# Patient Record
Sex: MALE | Race: WHITE | NOT HISPANIC OR LATINO | URBAN - METROPOLITAN AREA
[De-identification: names, ages, dates, MRNs, and addresses within clinical notes are randomized per-mention and may not be internally consistent; named-entity substitution may affect disease eponyms.]

---

## 2019-07-11 ENCOUNTER — OUTPATIENT (OUTPATIENT)
Dept: OUTPATIENT SERVICES | Facility: HOSPITAL | Age: 49
LOS: 1 days | Discharge: HOME | End: 2019-07-11

## 2019-07-11 DIAGNOSIS — I10 ESSENTIAL (PRIMARY) HYPERTENSION: ICD-10-CM

## 2019-07-11 DIAGNOSIS — R80.9 PROTEINURIA, UNSPECIFIED: ICD-10-CM

## 2019-10-17 ENCOUNTER — EMERGENCY (EMERGENCY)
Facility: HOSPITAL | Age: 49
LOS: 0 days | Discharge: HOME | End: 2019-10-17
Attending: EMERGENCY MEDICINE | Admitting: EMERGENCY MEDICINE
Payer: COMMERCIAL

## 2019-10-17 VITALS
RESPIRATION RATE: 18 BRPM | DIASTOLIC BLOOD PRESSURE: 86 MMHG | TEMPERATURE: 96 F | SYSTOLIC BLOOD PRESSURE: 126 MMHG | HEART RATE: 80 BPM | OXYGEN SATURATION: 99 %

## 2019-10-17 VITALS
TEMPERATURE: 96 F | SYSTOLIC BLOOD PRESSURE: 168 MMHG | HEART RATE: 84 BPM | DIASTOLIC BLOOD PRESSURE: 105 MMHG | WEIGHT: 199.96 LBS | RESPIRATION RATE: 18 BRPM | OXYGEN SATURATION: 100 %

## 2019-10-17 DIAGNOSIS — R04.0 EPISTAXIS: ICD-10-CM

## 2019-10-17 DIAGNOSIS — I10 ESSENTIAL (PRIMARY) HYPERTENSION: ICD-10-CM

## 2019-10-17 PROCEDURE — 99283 EMERGENCY DEPT VISIT LOW MDM: CPT | Mod: 25

## 2019-10-17 PROCEDURE — 30903 CONTROL OF NOSEBLEED: CPT

## 2019-10-17 RX ORDER — CEPHALEXIN 500 MG
1 CAPSULE ORAL
Qty: 30 | Refills: 0
Start: 2019-10-17 | End: 2019-10-26

## 2019-10-17 NOTE — ED PROVIDER NOTE - CARE PROVIDER_API CALL
Donnell Woodruff)  Otolaryngology  [address] [place] [address], [address] [place] [address]  Phone: [phone]  Fax: [phone]  Follow Up Time: 1-3 Days

## 2019-10-17 NOTE — ED ADULT NURSE NOTE - CHPI ED NUR SYMPTOMS NEG
no chills/no decreased eating/drinking/no tingling/no weakness/no nausea/no pain/no vomiting/no dizziness/no fever

## 2019-10-17 NOTE — ED ADULT NURSE NOTE - OBJECTIVE STATEMENT
pt states that he was sleeping and suddenly began to experience a nose bleed x2 hours, denies injury or trauma, not on blood thinners

## 2019-10-17 NOTE — ED PROVIDER NOTE - CLINICAL SUMMARY MEDICAL DECISION MAKING FREE TEXT BOX
49m w L nares epistaxis. nontoxic appearing, n/v intact, no symptoms of anemia. No use of blood thinners. Direct pressure applied w initial resolution of bleeding but then recurrence. L nares nasal packing performed w resolution of bleeding. Abx prescribed for ppx. Pt advised regarding symptomatic/supportive care, importance of PMD/ENT f/u, and symptoms to prompt ED return.

## 2019-10-17 NOTE — ED ADULT NURSE NOTE - CAS ELECT INFOMATION PROVIDED
DC instructions/pt d.nikita with rhino rocket in left nare, instructed to follow up with ent or return to ed for new or worsening symptoms

## 2019-10-17 NOTE — ED ADULT NURSE NOTE - NSIMPLEMENTINTERV_GEN_ALL_ED
Implemented All Universal Safety Interventions:  [place] to call system. Call bell, personal items and telephone within reach. Instruct patient to call for assistance. Room bathroom lighting operational. Non-slip footwear when patient is off stretcher. Physically safe environment: no spills, clutter or unnecessary equipment. Stretcher in lowest position, wheels locked, appropriate side rails in place.
normal...

## 2019-10-17 NOTE — ED PROVIDER NOTE - OBJECTIVE STATEMENT
48 y/o M with PMH HTN, not on blood thinners/asa presents with L nare epistaxis which began suddenly and painlessly while seated. +mild/constant. no palliating/provoking factors. denies trauma/injury.

## 2019-10-17 NOTE — ED PROVIDER NOTE - ADDITIONAL RISK FACTOR FREE TEXT BOX
49m w L nares epistaxis. nontoxic appearing, n/v intact, no symptoms of anemia. No use of blood thinners.

## 2019-10-17 NOTE — ED PROVIDER NOTE - ATTENDING CONTRIBUTION TO CARE
49m w HTN presents w epistaxis from L nares tonight. Symptoms are mild, constant, no exacerbating/alleviating. No trauma/injury. No use of blood thinners. Patient in usual state of health prior. No other injury, no other complaints.     Review of Systems  Constitutional:  No fever or chills.   Eyes:  Negative.   ENMT:  No nasal congestion, discharge, or throat pain. +Epistaxis  Cardiac:  No chest pain, syncope, or edema.  Respiratory:  No dyspnea, wheezing, or cough. No hemoptysis.  GI:  No vomiting, diarrhea, or abdominal pain. No melena or hematochezia.  :  No dysuria or hematuria.   Musculoskeletal:  No joint swelling, joint pain, or back pain.  Skin:  No skin rash, jaundice, or lesions.  Neuro:  No headache, loss of sensation, or focal weakness.  No change in mental status.     Physical Exam  General: Awake, alert, NAD, WDWN, non-toxic appearing, NCAT  Eyes: PERRL, EOMI, no icterus, lids and conjunctivae are normal  ENT: External inspection normal, pink/moist membranes, pharynx normal, no blood in posterior pharynx. Mild oozing blood from L anterior plexus.  CV: S1S2, regular rate and rhythm, no murmur/gallops/rubs, no JVD, 2+ pulses b/l, no edema/cords/homans, warm/well-perfused  Respiratory: Normal respiratory rate/effort, no respiratory distress, normal voice, speaking full sentences, lungs clear to auscultation b/l, no wheezing/rales/rhonchi, no retractions, no stridor  Abdomen: Soft abdomen, no tender/distended/guarding/rebound, no CVA tender  Musculoskeletal: FROM all 4 extremities, N/V intact  Neck: FROM neck, supple, no meningismus, trachea midline, no JVD  Integumentary: Color normal for race, warm and dry, no rash  Neuro: Oriented x3, CN 2-12 grossly intact, normal motor, normal sensory  Psych: Oriented x3, mood normal, affect normal     49m w L nares epistaxis. nontoxic appearing, n/v intact, no symptoms of anemia. No use of blood thinners. --Will obtain hemostasis, observe/re-assess.

## 2019-10-17 NOTE — ED PROCEDURE NOTE - ATTENDING CONTRIBUTION TO CARE
I was present for and supervised the key/critical aspects of the procedures performed during the care of the patient. Nasal Packing

## 2019-10-17 NOTE — ED PROVIDER NOTE - PROGRESS NOTE DETAILS
current minimal oozing. direct pressure applied, will reassess. pt rebleed with oozing from L nare. packed. reassessed now, 1 hr later, still no bleed. pt understands needs to see ENT today and relates will comply. sent abx rx to pharmacy.

## 2019-10-17 NOTE — ED PROVIDER NOTE - PHYSICAL EXAMINATION
PHYSICAL EXAM:    GENERAL: Alert, appears stated age, well appearing, non-toxic  SKIN: Warm, pink and dry. MMM.   EYE: Normal lids/conjunctiva  ENT: Normal hearing, patent oropharynx without erythema or exudate. +L nare slight oozing blood. no open wounds visible. no blood in oropharynx.   NECK: +supple. No meningismus, or JVD  Pulm: Bilateral BS, normal resp effort, no wheezes, stridor, or retractions  CV: RRR, no M/R/G, 2+and = radial pulses  Abd: soft, non-tender, non-distended  Mskel: no erythema, cyanosis, edema. no calf tenderness  Neuro: AAOx3 5/5 strength throughout. normal gait.

## 2019-10-17 NOTE — ED PROVIDER NOTE - PATIENT PORTAL LINK FT
You can access the FollowMyHealth Patient Portal offered by [facility] by registering at the following website: [email or web address]. By joining [facility]’s FollowMyHealth portal, you will also be able to view your health information using other applications (apps) compatible with our system.

## 2019-10-17 NOTE — ED PROVIDER NOTE - NS ED ROS FT
Review of Systems    Constitutional: (-) fever   Eyes/ENT: (-) vision changes, (+) epistaxis   Cardiovascular: (-) chest pain, (-) syncope (-) palpitations  Respiratory: (-) cough, (-) shortness of breath  Gastrointestinal: (-) vomiting  Integumentary: (-) rash  Neurological: (-) headache  Hematologic: (-) easy bruising   Allergic/Immunologic: (-) pruritus

## 2019-10-19 ENCOUNTER — EMERGENCY (EMERGENCY)
Facility: HOSPITAL | Age: 49
LOS: 0 days | Discharge: HOME | End: 2019-10-19
Admitting: EMERGENCY MEDICINE

## 2019-10-19 VITALS
SYSTOLIC BLOOD PRESSURE: 134 MMHG | WEIGHT: 199.96 LBS | OXYGEN SATURATION: 97 % | TEMPERATURE: 97 F | HEART RATE: 108 BPM | DIASTOLIC BLOOD PRESSURE: 93 MMHG | RESPIRATION RATE: 18 BRPM | HEIGHT: 66 IN

## 2019-10-19 VITALS — HEART RATE: 90 BPM

## 2019-10-19 DIAGNOSIS — R04.0 EPISTAXIS: ICD-10-CM

## 2019-10-19 PROBLEM — I10 ESSENTIAL (PRIMARY) HYPERTENSION: Chronic | Status: ACTIVE | Noted: 2019-10-17

## 2019-10-19 NOTE — ED PROVIDER NOTE - PROGRESS NOTE DETAILS
Discussed risks of nasal packing removal including rebleeding/ repacking. Patient understands and wants packing removed. Patient watched for over 30 minutes no rebleeding.

## 2019-10-19 NOTE — ED PROVIDER NOTE - OBJECTIVE STATEMENT
50 y/o male with hx HTN presents to the ED c/o "I woke up on Wednesday night with left sided nose bleed. I was seen in the ED and my nose was packed. I was unable to get to see an ENT till next week. I want the packing removed." no HA/ dizziness/ fever/ chills

## 2019-10-19 NOTE — ED PROVIDER NOTE - CARE PROVIDER_API CALL
Donnell Woodruff)  Otolaryngology  [address] [place] [address], [address] [place] [address]  Phone: [phone]  Fax: [phone]  Follow Up Time:

## 2022-02-17 NOTE — ED PROVIDER NOTE - DATE/TIME 2
17-Oct-2019 06:34 Prednisone Counseling:  I discussed with the patient the risks of prolonged use of prednisone including but not limited to weight gain, insomnia, osteoporosis, mood changes, diabetes, susceptibility to infection, glaucoma and high blood pressure.  In cases where prednisone use is prolonged, patients should be monitored with blood pressure checks, serum glucose levels and an eye exam.  Additionally, the patient may need to be placed on GI prophylaxis, PCP prophylaxis, and calcium and vitamin D supplementation and/or a bisphosphonate.  The patient verbalized understanding of the proper use and the possible adverse effects of prednisone.  All of the patient's questions and concerns were addressed.

## 2022-04-05 NOTE — ED PROVIDER NOTE - NOSE, MLM
CONSULTATION NOTE - GENERAL NEUROLOGY    CHIEF COMPLAINT  Left numbness    HISTORY OF PRESENT ILLNESS  Mr. Hernandez is a  50 year old year old male with a history of hypertension and a left ulnar neuropathy, who was seen back in February for left sided sensory impairment, came back to the ER yesterday for evaluation of similar symptoms.  He wsa seen by me previously on 2/21/22 for left face/arm/leg numbness.  He had a negative brain MRI and CTA and was discharged on aspirin, told to follow up in clinic.  He took aspirin briefly and then stopped it, and he did not schedule a followup appointment.  Since that encounter he says he's had persistent numbness in the left foot, in a very isolated pattern involving the left lateral aspect of the foot into the dorsum.  No weakness, but if he tries to walk quickly he limps a bit.  He also has persisent tingling and numbness in digits 4-5 of the left hand.  No weakness.  No facial involvement.  He says these symptoms are relatively unchanged compared to February.  He came to the ER yesterday because he says that upon arriving to work yesterday he was flushed and sweating, so his boss sent him home.  He says he's taken his BP a few times since being here last and it has been elevated, sometimes as high as the 180s systolic.     HISTORIES:    Past Medical History:   Diagnosis Date   • Cubital tunnel syndrome on left     chronic   • Essential (primary) hypertension        Past Surgical History:   Procedure Laterality Date   • Appendectomy     • Lipoma resection         Social History     Tobacco Use   • Smoking status: Never Smoker   • Smokeless tobacco: Never Used   Vaping Use   • Vaping Use: never used   Substance Use Topics   • Alcohol use: Yes     Comment: ON OCCASION   • Drug use: Not Currently       INPATIENT MEDICATIONS:  • sodium chloride (PF)  2 mL Intracatheter 2 times per day       As needed meds: sodium chloride, sodium chloride     HOME MEDICATIONS:  Medications  Prior to Admission   Medication Sig Dispense Refill   • aspirin 81 MG EC tablet Take 1 tablet by mouth daily. Do not start before February 23, 2022. 30 tablet 0       ALLERGIES:  Allergies as of 04/04/2022   • (No Known Allergies)        REVIEW OF SYSTEMS :   Dizziness  Flushing sensation  L hand and foot numbness as above    PHYSICAL EXAM  Vitals:  /84 (BP Location: LUE - Left upper extremity, Patient Position: Supine)   Pulse 79   Temp 98.6 °F (37 °C) (Oral)   Resp 17   Ht 6' (1.829 m)   Wt 109.7 kg (241 lb 13.5 oz)   BMI 32.80 kg/m²   BSA 2.31 m²   General: Well-developed, well-nourished male.  In no acute distress.  Head & Neck:  Normocephalic and atraumatic.    Psychiatric:  Affect was appropriate to situation and mood was normal.  Neurologic:    Mentation: Alert and awake. Oriented to person and place. Speech is fluent without any anomia or dysarthria.  Patient is able to provide adequate history with a good fund of knowledge.  Short and long term memory was within normal range for age.  No evidence of obvious cognitive impairment.   Cranial Nerves:    CN I: Not tested.  CN II: Visual acuity grossly intact. Visual fields full to confrontational testing. Pupils equal and reactive (CN II/III)  CN III, IV, VI: Extraocular muscles are intact without nystagmus or gaze paresis. There is no ptosis.  CN V: Facial sensation intact V1-V3 bilaterally.  Muscles of mastication normal.  CN VII: Facial movement full and symmetric.  CN VIII: Hearing intact to a speaking voice.  CN IX, X: Palate elevates in the midline, normal gag, swallow, and phonation.  CN XI: Sternocleidomastoid and trapezius strength is symmetric and full  CN XII: Tongue protrudes midline without atrophy or fasciculations.  Cerebellar exam:  Finger-nose-finger and rapid alternating movements are intact.    Motor exam:  Normal casual gait with abililty to walk unassisted.  When walking quickly out in the hallway, he has a slight limp due to  the left leg bothering him. Strength in all 4 extremities is 5/5.  Reflex examination was 2/4 in upper and lower extremities.    Sensory exam:  Sensory loss in the left medial hand and digits 4-5.  Also in the lateral aspect of the left foot and dorsum of the left foot.      LABORATORY:  I have reviewed the pertinent laboratory tests:    Recent Labs   Lab 04/05/22  0542   WBC 3.4*   RBC 4.93   HGB 15.8   HCT 45.7        Recent Labs   Lab 04/05/22  0542   SODIUM 135   POTASSIUM 3.4   CHLORIDE 101   CO2 24   BUN 11   CREATININE 0.57*   CALCIUM 9.1   ALBUMIN 3.9   BILIRUBIN 1.4*   ALKPT 62   GPT 37   AST 44*   GLUCOSE 85     Recent Labs   Lab 04/04/22  1610   PTT 28   PT 10.7   INR 1.0     Recent Labs   Lab 04/05/22  0542   CHOLESTEROL 283*   TRIGLYCERIDE 87      CALCLDL 142*       IMAGING/OTHER TESTING:  I have reviewed the pertinent imaging/study reports.      CT HEAD STROKE ALERT LEVEL 1 WO CONTRAST   Final Result   No acute abnormality.  No evidence of acute infarction.      Electronically Signed by: LEANN CADENA M.D.    Signed on: 4/4/2022 4:27 PM            ASSESSMENT/PLAN  49 yo male with hypertension (not on any regular medications) with numbness in his left hand and foot for the past few months.  No weakness on exam though he limps a bit when he walks quickly. MRI brain and CTA head and neck for this same problem done in February was negative.  I offered to repeat the MRI brain today acknowledging that a small portion of strokes may not show up on initial imaging, but he declined and says he wants to go home.  I think this is reasonable; he did get a followup CT head yesterday that was unremarkable and he does not have any new deficits on his exam for me.    We reviewed typical stroke symptoms and I told him that as long as his left sided deficits remain limited to sensory impairment in very specific distributions as they are now, he did not need to come to the ER.  If however there is weakness,  change in speech, a facial droop, or loss of vision he should return.    Plan  1. I recommended resuming aspirin 81mg daily.  He took this briefly last time and stopped it.  I told him this would not be a permanent treatment and we'd consider stopping it in the coming months depending on how he's doing.  2. Outpatient followup with me in a few weeks.  We will consider an EMG of the left upper and lower extremities, for likely ulnar neuropathy and to look for other peripheral processes affecting the LLE (lumbar radiculopathy, mononeuropathy, etc). We'll also give consideration to imaging his lumbar spine and monitor his BP for him.   3. He says he's been having high BP readings at home and was formerly on lisinopril but is not currently taking anything for BP.  I'd consider resuming something at a low dose, but will defer this to medicine.  4. Ok to discharge from my standpoint.      Yoni Gilman DO   (available via [employer])  [facility] - Neurology Attending  Date: 4/5/2022   abnormal/expand...